# Patient Record
Sex: FEMALE | Race: WHITE
[De-identification: names, ages, dates, MRNs, and addresses within clinical notes are randomized per-mention and may not be internally consistent; named-entity substitution may affect disease eponyms.]

---

## 2020-01-09 ENCOUNTER — HOSPITAL ENCOUNTER (OUTPATIENT)
Dept: HOSPITAL 59 - HOP | Age: 60
Discharge: HOME | End: 2020-01-09
Attending: INTERNAL MEDICINE
Payer: COMMERCIAL

## 2020-01-09 DIAGNOSIS — R10.9: ICD-10-CM

## 2020-01-09 DIAGNOSIS — F17.210: ICD-10-CM

## 2020-01-09 DIAGNOSIS — R19.4: Primary | ICD-10-CM

## 2020-01-09 DIAGNOSIS — J44.9: ICD-10-CM

## 2020-01-09 DIAGNOSIS — K57.30: ICD-10-CM

## 2020-01-10 NOTE — OPERATIVE NOTE
OPERATION: COLONOSCOPY with random biopsy and photo. 



PREOPERATIVE DIAGNOSES: 

1. Change in bowel habits. 

2. Rule out microscopic colitis. 

3. Colon cancer screening.

4. Lower abdominal pain. 



POSTOPERATIVE DIAGNOSES: 

1. Moderate-to-severe sigmoid diverticulosis. 

2. Otherwise normal exam, rule out microscopic colitis. 



PROCEDURE: After informed consent was obtained from the patient, she was placed 
in the left lateral decubitus position in the endoscopy suite, sedated and 
monitored by the department of anesthesia. Digital rectal exam was unremarkable.
A well-lubricated IOO051 colonoscope was inserted into the rectum and advanced 
to the cecum. Preparation quality was good to excellent. The cecum, cecal bulb, 
distal portion of the terminal ileum, ascending colon, transverse colon, and 
descending colon were unremarkable. No polyps, mass lesions, or inflammation was
seen. The sigmoid colon demonstrated moderate-to-severe diverticular changes. 
The rectum was unremarkable in forward and J-turn views. It should be noted that
random biopsies were obtained throughout the length of the colon. The endoscope 
was straightened, the rectal ampulla deflated, and the endoscope was removed. 



RECOMMENDATIONS: The patient should use a fiber supplement such as Citrucel or 
Benefiber and follow a high-fiber diet. Further recommendations based on tissue 
histology. In addition, the patient should undergo repeat screening exam in 10 
years. 



As always, thank you for allowing me to participate in the healthcare of your 
patients. 

MARCEL